# Patient Record
Sex: FEMALE | Race: WHITE | Employment: FULL TIME | URBAN - METROPOLITAN AREA
[De-identification: names, ages, dates, MRNs, and addresses within clinical notes are randomized per-mention and may not be internally consistent; named-entity substitution may affect disease eponyms.]

---

## 2019-02-01 ENCOUNTER — HOSPITAL ENCOUNTER (EMERGENCY)
Facility: HOSPITAL | Age: 39
Discharge: HOME | End: 2019-02-01
Attending: EMERGENCY MEDICINE
Payer: COMMERCIAL

## 2019-02-01 VITALS
DIASTOLIC BLOOD PRESSURE: 77 MMHG | TEMPERATURE: 98 F | OXYGEN SATURATION: 98 % | SYSTOLIC BLOOD PRESSURE: 124 MMHG | RESPIRATION RATE: 19 BRPM | HEART RATE: 81 BPM

## 2019-02-01 DIAGNOSIS — R11.2 NON-INTRACTABLE VOMITING WITH NAUSEA, UNSPECIFIED VOMITING TYPE: Primary | ICD-10-CM

## 2019-02-01 LAB
ANION GAP SERPL CALC-SCNC: 18 MMOL/L (ref 0–18)
BILIRUB UR QL: NEGATIVE
BUN SERPL-MCNC: 25 MG/DL (ref 8–20)
BUN/CREAT SERPL: 30.1 (ref 10–20)
CALCIUM SERPL-MCNC: 9.3 MG/DL (ref 8.5–10.5)
CHLORIDE SERPL-SCNC: 103 MMOL/L (ref 95–110)
CLARITY UR: CLEAR
CO2 SERPL-SCNC: 21 MMOL/L (ref 22–32)
COLOR UR: YELLOW
CREAT SERPL-MCNC: 0.83 MG/DL (ref 0.5–1.5)
GLUCOSE SERPL-MCNC: 118 MG/DL (ref 70–99)
GLUCOSE UR-MCNC: NEGATIVE MG/DL
HGB UR QL STRIP.AUTO: NEGATIVE
KETONES UR-MCNC: NEGATIVE MG/DL
LEUKOCYTE ESTERASE UR QL STRIP.AUTO: NEGATIVE
MAGNESIUM SERPL-MCNC: 2.2 MG/DL (ref 1.8–2.5)
NITRITE UR QL STRIP.AUTO: NEGATIVE
OSMOLALITY UR CALC.SUM OF ELEC: 299 MOSM/KG (ref 275–295)
PH UR: 5 [PH] (ref 5–8)
POTASSIUM SERPL-SCNC: 3.8 MMOL/L (ref 3.3–5.1)
PROT UR-MCNC: NEGATIVE MG/DL
SODIUM SERPL-SCNC: 142 MMOL/L (ref 136–144)
SP GR UR STRIP: 1.02 (ref 1–1.03)
UROBILINOGEN UR STRIP-ACNC: <2
VIT C UR-MCNC: NEGATIVE MG/DL

## 2019-02-01 PROCEDURE — 81003 URINALYSIS AUTO W/O SCOPE: CPT | Performed by: EMERGENCY MEDICINE

## 2019-02-01 PROCEDURE — 83735 ASSAY OF MAGNESIUM: CPT | Performed by: EMERGENCY MEDICINE

## 2019-02-01 PROCEDURE — 99284 EMERGENCY DEPT VISIT MOD MDM: CPT

## 2019-02-01 PROCEDURE — 96374 THER/PROPH/DIAG INJ IV PUSH: CPT

## 2019-02-01 PROCEDURE — 80048 BASIC METABOLIC PNL TOTAL CA: CPT | Performed by: EMERGENCY MEDICINE

## 2019-02-01 PROCEDURE — 96361 HYDRATE IV INFUSION ADD-ON: CPT

## 2019-02-01 RX ORDER — ONDANSETRON 4 MG/1
4 TABLET, ORALLY DISINTEGRATING ORAL EVERY 8 HOURS PRN
Qty: 15 TABLET | Refills: 0 | Status: SHIPPED | OUTPATIENT
Start: 2019-02-01 | End: 2019-02-01

## 2019-02-01 RX ORDER — ONDANSETRON 2 MG/ML
4 INJECTION INTRAMUSCULAR; INTRAVENOUS ONCE
Status: COMPLETED | OUTPATIENT
Start: 2019-02-01 | End: 2019-02-01

## 2019-02-01 RX ORDER — TRAMADOL HYDROCHLORIDE 50 MG/1
50 TABLET ORAL EVERY 12 HOURS PRN
Qty: 6 TABLET | Refills: 0 | Status: SHIPPED | OUTPATIENT
Start: 2019-02-01 | End: 2019-02-04

## 2019-02-01 RX ORDER — ONDANSETRON 4 MG/1
4 TABLET, ORALLY DISINTEGRATING ORAL EVERY 8 HOURS PRN
Qty: 15 TABLET | Refills: 0 | Status: SHIPPED | OUTPATIENT
Start: 2019-02-01 | End: 2019-02-06

## 2019-02-01 RX ORDER — METOCLOPRAMIDE 10 MG/1
10 TABLET ORAL EVERY 6 HOURS PRN
Qty: 20 TABLET | Refills: 0 | Status: SHIPPED | OUTPATIENT
Start: 2019-02-01 | End: 2019-02-06

## 2019-02-02 NOTE — ED INITIAL ASSESSMENT (HPI)
Patient states she has thrown up so many times she cannot even count since this morning. Also had one episode of diarrhea. Patient had a microdiscectomy Monday and has some pain from that.

## 2019-02-02 NOTE — ED PROVIDER NOTES
Patient Seen in: [facility] AND [facility] Emergency Department    History   Patient presents with:  Nausea/Vomiting/Diarrhea (gastrointestinal)    Stated Complaint: n/v hx of back surgery monday      HPI    40 yo F now POD 4 from low back surgery and started on h clean/dry/intact. Neurological: Alert. Skin: Skin is warm. Psychiatric: Cooperative. Nursing note and vitals reviewed.         ED Course     Labs Reviewed   BASIC METABOLIC PANEL (8) - Abnormal; Notable for the following components:       Result Value for Pain (Use caution while driving or operating machinery, this medication may make you drowsy. )., Normal, Disp-6 tablet, R-0    Metoclopramide HCl 10 MG Oral Tab  Take 1 tablet (10 mg total) by mouth every 6 (six) hours as needed (For nausea/vomiting. ). ,

## 2023-12-15 ENCOUNTER — TELEPHONE (OUTPATIENT)
Dept: UROLOGY | Facility: CLINIC | Age: 43
End: 2023-12-15

## 2023-12-15 NOTE — TELEPHONE ENCOUNTER
Spoke with patient to remind of 12/18 appointment. Provided New Patient instructions, patient expressed understanding and confirmed appointment.
travis

## 2023-12-18 ENCOUNTER — OFFICE VISIT (OUTPATIENT)
Dept: UROLOGY | Facility: CLINIC | Age: 43
End: 2023-12-18
Attending: OBSTETRICS & GYNECOLOGY
Payer: COMMERCIAL

## 2023-12-18 VITALS
DIASTOLIC BLOOD PRESSURE: 68 MMHG | TEMPERATURE: 98 F | RESPIRATION RATE: 16 BRPM | WEIGHT: 224 LBS | HEIGHT: 63 IN | BODY MASS INDEX: 39.69 KG/M2 | SYSTOLIC BLOOD PRESSURE: 118 MMHG

## 2023-12-18 DIAGNOSIS — N81.6 RECTOCELE: Primary | ICD-10-CM

## 2023-12-18 DIAGNOSIS — N81.89 PELVIC FLOOR WEAKNESS: ICD-10-CM

## 2023-12-18 DIAGNOSIS — R33.9 INCOMPLETE BLADDER EMPTYING: ICD-10-CM

## 2023-12-18 DIAGNOSIS — K59.00 CONSTIPATION: ICD-10-CM

## 2023-12-18 DIAGNOSIS — N39.3 FEMALE STRESS INCONTINENCE: ICD-10-CM

## 2023-12-18 PROCEDURE — 99212 OFFICE O/P EST SF 10 MIN: CPT

## 2023-12-18 RX ORDER — SENNOSIDES 8.8 MG/5ML
5 LIQUID ORAL 2 TIMES DAILY
COMMUNITY

## 2023-12-18 RX ORDER — LEVOTHYROXINE SODIUM 0.03 MG/1
TABLET ORAL
COMMUNITY

## 2023-12-18 RX ORDER — BACLOFEN 10 MG/1
TABLET ORAL
COMMUNITY
Start: 2023-03-14

## 2023-12-18 RX ORDER — WHEAT DEXTRIN 1 G
TABLET,CHEWABLE ORAL
COMMUNITY

## 2024-01-16 ENCOUNTER — TELEPHONE (OUTPATIENT)
Dept: UROLOGY | Facility: CLINIC | Age: 44
End: 2024-01-16

## 2024-01-16 NOTE — TELEPHONE ENCOUNTER
TC from patient stating that she would like to cancel her UDS and UDS follow-up as she will be seeing a new doctor

## 2024-04-22 RX ORDER — AMOXICILLIN 500 MG/1
500 CAPSULE ORAL 2 TIMES DAILY
COMMUNITY

## 2024-04-25 ENCOUNTER — HOSPITAL ENCOUNTER (OUTPATIENT)
Facility: HOSPITAL | Age: 44
Setting detail: HOSPITAL OUTPATIENT SURGERY
Discharge: HOME | End: 2024-04-25
Attending: INTERNAL MEDICINE | Admitting: INTERNAL MEDICINE
Payer: COMMERCIAL

## 2024-04-25 VITALS
DIASTOLIC BLOOD PRESSURE: 99 MMHG | BODY MASS INDEX: 38.27 KG/M2 | HEART RATE: 99 BPM | HEIGHT: 63 IN | OXYGEN SATURATION: 98 % | WEIGHT: 216 LBS | SYSTOLIC BLOOD PRESSURE: 126 MMHG | RESPIRATION RATE: 18 BRPM

## 2024-04-25 PROCEDURE — 99152 MOD SED SAME PHYS/QHP 5/>YRS: CPT | Performed by: INTERNAL MEDICINE

## 2024-04-25 PROCEDURE — 99153 MOD SED SAME PHYS/QHP EA: CPT | Performed by: INTERNAL MEDICINE

## 2024-04-25 PROCEDURE — 0DJD8ZZ INSPECTION OF LOWER INTESTINAL TRACT, VIA NATURAL OR ARTIFICIAL OPENING ENDOSCOPIC: ICD-10-PCS | Performed by: INTERNAL MEDICINE

## 2024-04-25 PROCEDURE — S0028 INJECTION, FAMOTIDINE, 20 MG: HCPCS | Performed by: STUDENT IN AN ORGANIZED HEALTH CARE EDUCATION/TRAINING PROGRAM

## 2024-04-25 RX ORDER — ACETAMINOPHEN 500 MG
1000 TABLET ORAL ONCE
Status: CANCELLED | OUTPATIENT
Start: 2024-04-25 | End: 2024-04-25

## 2024-04-25 RX ORDER — SODIUM CHLORIDE 0.9 % (FLUSH) 0.9 %
10 SYRINGE (ML) INJECTION AS NEEDED
Status: DISCONTINUED | OUTPATIENT
Start: 2024-04-25 | End: 2024-04-25

## 2024-04-25 RX ORDER — SODIUM CHLORIDE, SODIUM LACTATE, POTASSIUM CHLORIDE, CALCIUM CHLORIDE 600; 310; 30; 20 MG/100ML; MG/100ML; MG/100ML; MG/100ML
INJECTION, SOLUTION INTRAVENOUS CONTINUOUS
Status: DISCONTINUED | OUTPATIENT
Start: 2024-04-25 | End: 2024-04-25

## 2024-04-25 RX ORDER — OXYCODONE HYDROCHLORIDE 5 MG/1
1 TABLET ORAL EVERY 12 HOURS PRN
COMMUNITY

## 2024-04-25 RX ORDER — METOCLOPRAMIDE 10 MG/1
10 TABLET ORAL ONCE
Status: DISCONTINUED | OUTPATIENT
Start: 2024-04-25 | End: 2024-04-25

## 2024-04-25 RX ORDER — METOCLOPRAMIDE HYDROCHLORIDE 5 MG/ML
10 INJECTION INTRAMUSCULAR; INTRAVENOUS ONCE
Status: DISCONTINUED | OUTPATIENT
Start: 2024-04-25 | End: 2024-04-25

## 2024-04-25 RX ORDER — FAMOTIDINE 20 MG/1
20 TABLET, FILM COATED ORAL ONCE
Status: CANCELLED | OUTPATIENT
Start: 2024-04-25 | End: 2024-04-25

## 2024-04-25 RX ORDER — METOCLOPRAMIDE 10 MG/1
10 TABLET ORAL ONCE
Status: CANCELLED | OUTPATIENT
Start: 2024-04-25 | End: 2024-04-25

## 2024-04-25 RX ORDER — ACETAMINOPHEN/DIPHENHYDRAMINE 500MG-25MG
1 TABLET ORAL AS NEEDED
COMMUNITY

## 2024-04-25 RX ORDER — FAMOTIDINE 10 MG/ML
20 INJECTION, SOLUTION INTRAVENOUS ONCE
Status: DISCONTINUED | OUTPATIENT
Start: 2024-04-25 | End: 2024-04-25

## 2024-04-25 RX ORDER — FAMOTIDINE 20 MG/1
20 TABLET, FILM COATED ORAL ONCE
Status: DISCONTINUED | OUTPATIENT
Start: 2024-04-25 | End: 2024-04-25

## 2024-04-25 RX ORDER — SODIUM CHLORIDE, SODIUM LACTATE, POTASSIUM CHLORIDE, CALCIUM CHLORIDE 600; 310; 30; 20 MG/100ML; MG/100ML; MG/100ML; MG/100ML
INJECTION, SOLUTION INTRAVENOUS CONTINUOUS
Status: CANCELLED | OUTPATIENT
Start: 2024-04-25

## 2024-04-25 RX ORDER — MIDAZOLAM HYDROCHLORIDE 1 MG/ML
1 INJECTION INTRAMUSCULAR; INTRAVENOUS EVERY 5 MIN PRN
Status: DISCONTINUED | OUTPATIENT
Start: 2024-04-25 | End: 2024-04-25

## 2024-04-25 RX ORDER — FAMOTIDINE 10 MG/ML
20 INJECTION, SOLUTION INTRAVENOUS ONCE
Status: CANCELLED | OUTPATIENT
Start: 2024-04-25

## 2024-04-25 RX ORDER — ACETAMINOPHEN 500 MG
1000 TABLET ORAL ONCE
Status: DISCONTINUED | OUTPATIENT
Start: 2024-04-25 | End: 2024-04-25

## 2024-04-25 RX ORDER — METOCLOPRAMIDE HYDROCHLORIDE 5 MG/ML
10 INJECTION INTRAMUSCULAR; INTRAVENOUS ONCE
Status: CANCELLED | OUTPATIENT
Start: 2024-04-25

## 2024-04-25 NOTE — DISCHARGE INSTRUCTIONS
ENDOSCOPY DISCHARGE INSTRUCTIONS    Procedure Performed:   Colonoscopy    Endoscopist: No name on file  FINDINGS:   Normal colon and internal hemorrhoids which are the source of the isolated bleeding.    MEDICATIONS:  You may resume all other medications today    DIET:  Resume Normal Diet and Daily fiber supplement such as (Citrucel, Metamucil, Benefiber) 1 tabelspoon daily    BIOPSIES:  No biopsies were taken    X-RAYS/LABS:   No X-rays/Labs were ordered today    ADDITIONAL RECOMMENDATIONS:    - Repeat Colonoscopy at age 51 for screening.  - Bleeding is due to hemorrhoids (Resolved):  - Start on daily fiber supplement (Benefiber, Citrucel or Metamucil)  - Instructed to avoid straining with bowel movements  - Instructed to avoid sitting on toilet for more than 5 minutes at a time  - Start on Miralax as needed to soften stool  - Anusol if bleeding returns    Activity for remainder of today:    REST TODAY  DO NOT drive or operate heavy machinery  DO NOT drink any alcoholic beverages  DO NOT sign any legal documents or make any important decisions    After your procedure(s):  It is not unusual to feel bloated or gassy .  Passing gas and belching is encouraged. Lying on your left side with your knees flexed may relieve the discomfort. A hot pack to the abdomen may also help.    FOLLOW-UP:  Contact the office at [phone] for follow-up appointment is needed or if you develop any of the following:    Severe abdominal pain/discomfort     Excessive bleeding                     Black tarry stool    Difficulty breathing/swallowing      Persistent nausea/vomiting  Fever above 100 degrees or chills    Home Care Instructions for Colonoscopy with Sedation    Diet:  - Resume your regular diet as tolerated unless otherwise instructed.  - Start with light meals to minimize bloating.  - Do not drink alcohol today.    Medication:  - If you have questions about resuming your normal medications, please contact your Primary Care  Physician.    Activities:  - Take it easy today. Do not return to work today.  - Do not drive today.  - Do not operate any machinery today (including kitchen equipment).    Colonoscopy:  - You may notice some rectal \"spotting\" (a little blood on the toilet tissue) for a day or two after the exam. This is normal.  - If you experience any rectal bleeding (not spotting), persistent tenderness or sharp severe abdominal pains, oral temperature over 100 degrees Fahrenheit, light-headedness or dizziness, or any other problems, contact your doctor.    **If unable to reach your doctor, please go to the [facility] Emergency Room**    - Your referring physician will receive a full report of your examination.  - If you do not hear from your doctor's office within two weeks of your biopsy, please call them for your results.    You may be able to see your laboratory results in [employer] between 4 and 7 business days.  In some cases, your physician may not have viewed the results before they are released to [employer].  If you have questions regarding your results contact the physician who ordered the test/exam by phone or via [employer] by choosing \"Ask a Medical Question.\"

## 2024-04-25 NOTE — H&P
HISTORY AND PHYSICAL FOR ENDOSCOPIC PROCEDURE      Wanda Johnson Patient Status:  Hospital Outpatient Surgery    1980 MRN [id number]   Location [facility] ENDOSCOPY LAB SUITES Attending Alon Philip MD   Hosp Day # 0 PCP ANTONIO WHITE     Date of Consult:  25    Reason for Consultation:  Rectal Bleeding    History of Present Illness:  Wanda Johnson is a a(n) 43 year old female who presents for Colonoscopy.      History:  Past Medical History:    Back problem    lower back fusion and laminectomy    Depression    Hypothyroid    Muscle weakness    left leg numbness and weakness- spinal caudal equina    Neuropathy    left leg     Past Surgical History:   Procedure Laterality Date    Appendectomy      Hysterectomy  2023          Other surgical history      microdisectomy    Rotator cuff repair      Spine surgery procedure unlisted       History reviewed. No pertinent family history.   reports that she has never smoked. She has never used smokeless tobacco. She reports that she does not drink alcohol and does not use drugs.    Allergies:  Allergies   Allergen Reactions    Bee Venom ANAPHYLAXIS    Chlorhexidine RASH    Honey Bee Venom ANAPHYLAXIS    Adhesive Tape OTHER (SEE COMMENTS)     Tegaderm swelling and itching     Tramadol UNKNOWN and OTHER (SEE COMMENTS)     Nausea flu like symptoms     Vicodin [Hydrocodone-Acetaminophen] UNKNOWN     Nausea and flu like symptoms    Tolerates: Oxycodone and tylenol        Medications:    Current Facility-Administered Medications:     sodium chloride 0.9% 0.9% flush injection 10 mL, 10 mL, Intravenous, PRN    lactated ringers infusion, , Intravenous, Continuous    midazolam (Versed) 2 MG/2ML injection 1 mg, 1 mg, Intravenous, Q5 Min PRN    fentaNYL (Sublimaze) 50 mcg/mL injection 25 mcg, 25 mcg, Intravenous, Q5 Min PRN    lactated ringers infusion, , Intravenous, Continuous    famotidine (Pepcid) tab 20 mg, 20 mg, Oral, Once **OR**  famotidine (Pepcid) 20 mg/2mL injection 20 mg, 20 mg, Intravenous, Once    metoclopramide (Reglan) tab 10 mg, 10 mg, Oral, Once **OR** metoclopramide (Reglan) 5 mg/mL injection 10 mg, 10 mg, Intravenous, Once    Review of Systems:  Gastrointestinal: negative other than specified in the HPI  General: negative other than specified in the HPI  Neurological: negative other than specified in the HPI  Cardiovascular: negative other than specified in the HPI  Respiratory: negative other than specified in the HPI  Skin: negative other than specified in the HPI  Allergy: negative other than specified in the HPI  ENT: negative other than specified in the HPI  Physical Exam:    Height 160 cm (5' 3\"), weight 216 lb (98 kg).    General: Appears alert, oriented x3 and in no acute distress.  HEENT: Normal. No neck vein distention. Thyroid not enlarged.  No lymphadenopathy.  CV: S1 and S2 normal.  No murmurs or gallops.  Lungs: Clear to auscultation.  Abdomen: Soft and nondistended.  Nontender.  No masses.  Bowel sounds are present.  Back: No CVA tenderness.    Imaging:      Assessment/Plan:  43 year old female who presents for Colonoscopy.    Plan for Colonoscopy today.    Informed consent was obtained for colonoscopy with possible biopsy, dilation, polypectomy, therapy, banding and control of bleeding after explanation of risks, benefits and alternatives to the procedure. Risks include but not limited to bleeding, infection, perforation, missed polyps or cancer and risks of sedation.       Alon Philip MD  4/25/2024  3:19 PM

## 2024-04-25 NOTE — DISCHARGE INSTRUCTIONS
POST OPERATIVE INFORMATION & INSTRUCTIONS FOLLOWING ROBOTIC OR OPEN ABDOMINAL SURGERY  WHAT TO EXPECT AT HOME:     Recovery from surgery is generally 2-6 weeks, but sometimes longer for more strenuous activity.  It is normal to be very tired during this time.     Recuperation varies with each individual.  Some people recover more quickly than others.  Do not be discouraged if you need a little longer to recover.  It is common to have pain along the incisions, temporary bloating/gas pain, or shoulder pain after robotic surgery. This should improve with time and activity.   It often takes several weeks before bladder function returns to normal. It is common for many patients to experience some mild leakage, need to urinate often and immediately. If these problems are persistent and bothersome, please call your doctor's office or discuss at your post-operative visit  If you also had a vaginal surgery, it is normal to have some drainage/discharge or a small amount of vaginal bleeding after surgery which may last up to 6 weeks. You may also pass small pieces of suture for 4-6 weeks after surgery.  This is normal, and stitches are absorbable.     INCISIONS  Showering and bathing:   It is okay to shower 24 hours after your surgery.   Avoid baths/swimming/hot tubs/pools for 4 weeks or until your incisions completely heal. Keeping incisions underwater can affect the healing process.   Your incisions are closed with absorbable sutures and skin glue or surgical tape on top   You may let soapy water run over the incision. There is no need to scrub the incision. Allow the skin glue or surgical tape to fall off on its own.     ACTIVITY   Lifting: No more than 10 pounds for 6 weeks. For reference, a full gallon of milk is 10 pounds. Thus, no lifting laundry, groceries, children, or pets or pushing heavy vacuums, or grocery carts.  No high impact exercises (aerobic activity, using exercise machines, weight-lifting etc.) for 6  weeks.  We encourage you to start walking regularly starting the day after surgery.   You may climb stairs as tolerated  You may return to work 1-4 weeks after surgery, depending on your job and your surgery.  Ask your doctor about your specific situation. Please contact your doctor's office if you need any [id number] letters or medical leave paperwork completed.   Do not put anything in your vagina for 6 weeks after surgery unless otherwise instructed by your doctor (including tampons, douching, sexual intercourse, etc.).     When you begin to have sexual intercourse again, use water soluble lubricants (e.g., K-Y Jelly) for a short period of time. Most of the discomfort that is experienced early improves with time; however, report any long-term discomfort to your doctor.   Do not drive until you feel that you are ready and can safely slam the brakes if needed. Do not drive while you are taking narcotic pain medication  Avoid sitting or lying in bed for more than 2 hours at a time while you are awake to reduce your risk of blood clots.     PAIN      Vaginal soreness and pelvic discomfort are normal for approximately 6 weeks after surgery.    The first 3 days after surgery we recommend that you rotate between Tylenol and ibuprofen so that you are taking one of these medications every 3 to 4 hours while awake. In this way, you can help prevent pain.   Tylenol* and Ibuprofen** should be the first medications you use for pain. Heat or ice may also help. Please take Ibuprofen with food. Some pain medications can cause constipation (see the section on constipation).   After 3 days, you can take Tylenol and Ibuprofen only as needed.   You may have been prescribed a narcotic~ pain medication (Oxycodone, Percocet, Norco, Tylenol #3, Valium, Tramadol, Hydrocodone).  Use narcotic pain medications for severe pain not improved by the above the first few days after surgery. Please transition to Tylenol or ibuprofen only  within the first 2-3 days after surgery if possible.      Pain management plan example:   Step 1: Over the counter medications  Extra strength Tylenol (500 mg) - take 2 tabs (1000 mg) every 6 hours  Ibuprofen (200 mg) - take 3 tabs (600 mg) every 6 hours    For example:   6 AM - take 1000 mg Tylenol  9AM - take 600 mg Ibuprofen  12 PM - again take 1000 mg Tylenol  3 PM - again take 600 mg Ibuprofen  So on and so on…  Step 2: Prescription pain medication  Oxycodone 5 mg: take 1 tab every 8 hours for additional pain if prescribed    PAIN MEDICATION INFORMATION:    For the first 2 days you should take Toradol and Tylenol alternating every 6 hours scheduled. Do not wait for the pain. For example take Toradol at 3pm, Tylenol at 6pm, Toradol at 9pm and so on. This way you can get ahead of any pain before it starts.    *The maximum amount of Tylenol you can take in a 24-hour period is 4000 mg. Taking over this amount could cause liver damage. Make sure that if you are taking additional narcotic pain medication it does not contain acetaminophen, the active ingredient in Tylenol. lf it does, you must account for in your daily total. For example: Norco or Percocet typically contain 325mg of Tylenol. Wean this medication as tolerated. Tylenol is processed by your liver. Do not take Tylenol if you are have a history of liver failure. Do not mix with alcohol.   **You may take 200-800mg of ibuprofen (Advil) every 8 hours as needed for pain after you are done taking the Toradol. Do not take Toradol and Ibuprofen together as they are the same type of medication. This will help with pain from inflammation (swelling). Ibuprofen and Toradol is processed by your kidneys. If you have any history of kidney disease you should avoid ibuprofen, Toradol and all types of NSAIDS (Aleve, Motrin, Advil). Please take ibuprofen with food. Avoid if you have a history of stomach ulcers. If you are taking the maximum dose of ibuprofen (800mg every 8  hours), reduce this amount to 200-400mg ibuprofen every 8 hours as your pain improves.   ~Remember that narcotics are addictive, sedating, and constipating.  You should be taking a stool softener once or twice a day while on this medication. If you are prescribed narcotic pain medication, please use the medication as instructed. Do not use more than instructed. Do not take this medication with alcohol, sedatives, anti-anxiety medications, or sleep aids.      ~ If prescribed, it is important to keep narcotic pills safely stored, as it is at great risk of being stolen or misused by family, friends, or even strangers. Please be sure to dispose of leftover pain medication after you have recovered. You may dispose of unused narcotic medications in the trash with an unpleasant substance such as coffee grounds or cat litter. You can also check FDA.gov to assess which medications can be safely flushed down the toilet.     CONSTIPATION  Miralax daily as a stool softener until you are off of narcotics and your bowel habits are back to normal.  If you have diarrhea, stop the stool softener.    If you have not had a bowel movement 2 days after surgery, you should take Milk of Magnesia, Dulcolax, Metamucil, magnesium citrate or other over-the-counter (OTC) laxatives for relief. Take laxatives with plenty of water.  Do not take Milk of Magnesia or magnesium citrate if you have chronic kidney disease.  If you had a rectocele repair, rectal pressure (feeling like you need to have a bowel movement) is also very common.  However, you should not strain to have a bowel movement or sit on the toilet for extended periods of time.  The feeling like you need to have a bowel movement may just be the swelling from surgery.   Do not use rectal suppositories if you had a rectocele repair for 4 weeks after surgery     HOME MEDICATIONS:  It is uncommon that you would receive an antibiotic prescription to use at home. You received antibiotics  during surgery while in the hospital.  Please resume all of your home medications that you were on before surgery immediately.  If you are on a blood thinning medication, please resume 1 day after surgery unless otherwise instructed.  If you were prescribed vaginal estrogen, you should resume it 6 weeks after surgery unless otherwise instructed.     URINATION AFTER SURGERY  Immediately after surgery, you may have a catheter in place. If so, you may experience urinary urgency and some bladder pressure/pain.  Although these symptoms are often associated with a urinary tract infection, they can also be caused by bladder spasms.  Call our office if you are having fevers in addition to urinary urgency, burning with urination, and bladder pain. A fever (Temp > 101.5 ? F) is more suggestive of an infection.  Before you leave the hospital, you may be asked to perform a voiding trial.  This tests your ability to urinate and empty your bladder after surgery.  If you are able to urinate, you will be discharged home without a catheter.    Even if you pass the voiding trial, there is a small chance that you can go into urinary retention (have difficulty urinating).  If you do not urinate within 4-6 hours of catheter removal and you feel like your bladder is full but you can't urinate, go to your local urologist, local emergency room, or our emergency room for catheter placement.  If this occurs, please call our office so we can schedule an appointment for another voiding trial after the swelling has had time to subside.  If you do not pass the voiding trial prior to discharge, then the nursing staff will replace a Doe catheter in your bladder until the swelling resolves.  You will need to follow-up in our clinic or your local urologist's office in 3-4 days to repeat the voiding trial. In some cases you may also be taught how to perform self-catheterization. If this is necessary, further instructions will be provided.    DIET  You can resume a regular diet once you are discharged.  We recommend a light diet at first if you have nausea or vomiting from pain medications or anesthesia.  We also recommend a high-fiber diet to help with bowel movements.    FOLLOW UP  Typical follow-up is between 3-4 weeks after surgery with your doctor's assistant or your doctor  Your doctor's office will contact you regarding the timing of your follow up appointment  Call the number below for your doctor during normal business hours for your follow up appointment(s)      WHEN SHOULD I CALL THE DOCTOR?  If you have a sudden onset of severe abdominal pain with nausea/vomiting please contact your doctor's office immediately.    Call your doctor if you experience any of the following symptoms:   Bright red vaginal bleeding that soaks a heavy pad  Temperature greater than 101.5 ?F (38.5?C)   Persistent vomiting   Worsening pain that is not relieved by over the counter and prescription pain medication   Large amounts of vaginal discharge that is foul smelling, yellow or green that does not improve.    If questions or concerns:   Call [phone] to reach your physician's office or send a isocket message and either myself or one of my staff members will get back to you as soon as possible.     OR: Go to the nearest Emergency Room if you feel any signs of symptoms that warrant physician's immediate attention.     Manisha Berger DO, [facility]  [phone]   HOME INSTRUCTIONS  AMBSURG HOME CARE INSTRUCTIONS: POST-OP ANESTHESIA  The medication that you received for sedation or general anesthesia can last up to 24 hours. Your judgment and reflexes may be altered, even if you feel like your normal self.      We Recommend:   Do not drive any motor vehicle or bicycle   Avoid mowing the lawn, playing sports, or working with power tools/applicances (power saws, electric knives or mixers)   That you have someone stay with you on your first night home   Do not  drink alcohol or take sleeping pills or tranquilizers   Do not sign legal documents within 24 hours of your procedure   If you had a nerve block for your surgery, take extra care not to put any pressure on your arm or hand for 24 hours    It is normal:  For you to have a sore throat if you had a breathing tube during surgery (while you were asleep!). The sore throat should get better within 48 hours. You can gargle with warm salt water (1/2 tsp in 4 oz warm water) or use a throat lozenge for comfort  To feel muscle aches or soreness especially in the abdomen, chest or neck. The achy feeling should go away in the next 24 hours  To feel weak, sleepy or \"wiped out\". Your should start feeling better in the next 24 hours.   To experience mild discomforts such as sore lip or tongue, headache, cramps, gas pains or a bloated feeling in your abdomen.   To experience mild back pain or soreness for a day or two if you had spinal or epidural anesthesia.   If you had laparoscopic surgery, to feel shoulder pain or discomfort on the day of surgery.   For some patients to have nausea after surgery/anesthesia    If you feel nausea or experience vomiting:   Try to move around less.   Eat less than usual or drink only liquids until the next morning   Nausea should resolve in about 24 hours    If you have a problem when you are at home:    Call your surgeons office   Discharge Instructions: After Your Surgery  You’ve just had surgery. During surgery, you were given medicine called anesthesia to keep you relaxed and free of pain. After surgery, you may have some pain or nausea. This is common. Here are some tips for feeling better and getting well after surgery.   Going home  Your healthcare provider will show you how to take care of yourself when you go home. They'll also answer your questions. Have an adult family member or friend drive you home. For the first 24 hours after your surgery:   Don't drive or use heavy equipment.  Don't  make important decisions or sign legal papers.  Take medicines as directed.  Don't drink alcohol.  Have someone stay with you, if needed. They can watch for problems and help keep you safe.  Be sure to go to all follow-up visits with your healthcare provider. And rest after your surgery for as long as your provider tells you to.   Coping with pain  If you have pain after surgery, pain medicine will help you feel better. Take it as directed, before pain becomes severe. Also, ask your healthcare provider or pharmacist about other ways to control pain. This might be with heat, ice, or relaxation. And follow any other instructions your surgeon or nurse gives you.      Stay on schedule with your medicine.     Tips for taking pain medicine  To get the best relief possible, remember these points:   Pain medicines can upset your stomach. Taking them with a little food may help.  Most pain relievers taken by mouth need at least 20 to 30 minutes to start to work.  Don't wait till your pain becomes severe before you take your medicine. Try to time your medicine so that you can take it before starting an activity. This might be before you get dressed, go for a walk, or sit down for dinner.  Constipation is a common side effect of some pain medicines. Call your healthcare provider before taking any medicines such as laxatives or stool softeners to help ease constipation. Also ask if you should skip any foods. Drinking lots of fluids and eating foods such as fruits and vegetables that are high in fiber can also help. Remember, don't take laxatives unless your surgeon has prescribed them.  Drinking alcohol and taking pain medicine can cause dizziness and slow your breathing. It can even be deadly. Don't drink alcohol while taking pain medicine.  Pain medicine can make you react more slowly to things. Don't drive or run machinery while taking pain medicine.  Your healthcare provider may tell you to take acetaminophen to help ease  your pain. Ask them how much you're supposed to take each day. Acetaminophen or other pain relievers may interact with your prescription medicines or other over-the-counter (OTC) medicines. Some prescription medicines have acetaminophen and other ingredients in them. Using both prescription and OTC acetaminophen for pain can cause you to accidentally overdose. Read the labels on your OTC medicines with care. This will help you to clearly know the list of ingredients, how much to take, and any warnings. It may also help you not take too much acetaminophen. If you have questions or don't understand the information, ask your pharmacist or healthcare provider to explain it to you before you take the OTC medicine.   Managing nausea  Some people have an upset stomach (nausea) after surgery. This is often because of anesthesia, pain, or pain medicine, less movement of food in the stomach, or the stress of surgery. These tips will help you handle nausea and eat healthy foods as you get better. If you were on a special food plan before surgery, ask your healthcare provider if you should follow it while you get better. Check with your provider on how your eating should progress. It may depend on the surgery you had. These general tips may help:   Don't push yourself to eat. Your body will tell you when to eat and how much.  Start off with clear liquids and soup. They're easier to digest.  Next try semi-solid foods as you feel ready. These include mashed potatoes, applesauce, and gelatin.  Slowly move to solid foods. Don’t eat fatty, rich, or spicy foods at first.  Don't force yourself to have 3 large meals a day. Instead eat smaller amounts more often.  Take pain medicines with a small amount of solid food, such as crackers or toast. This helps prevent nausea.  When to call your healthcare provider  Call your healthcare provider right away if you have any of these:   You still have too much pain, or the pain gets worse, after  taking the medicine. The medicine may not be strong enough. Or there may be a complication from the surgery.  You feel too sleepy, dizzy, or groggy. The medicine may be too strong.  Side effects such as nausea or vomiting. Your healthcare provider may advise taking other medicines to .  Skin changes such as rash, itching, or hives. This may mean you have an allergic reaction. Your provider may advise taking other medicines.  The incision looks different (for instance, part of it opens up).  Bleeding or fluid leaking from the incision site, and weren't told to expect that.  Fever of 100.4°F (38°C) or higher, or as directed by your provider.  Call 911  Call 911 right away if you have:   Trouble breathing  Facial swelling    If you have obstructive sleep apnea   You were given anesthesia medicine during surgery to keep you comfortable and free of pain. After surgery, you may have more apnea spells because of this medicine and other medicines you were given. The spells may last longer than normal.    At home:  Keep using the continuous positive airway pressure (CPAP) device when you sleep. Unless your healthcare provider tells you not to, use it when you sleep, day or night. CPAP is a common device used to treat obstructive sleep apnea.  Talk with your provider before taking any pain medicine, muscle relaxants, or sedatives. Your provider will tell you about the possible dangers of taking these medicines.  Contact your provider if your sleeping changes a lot even when taking medicines as directed.  [employer] last reviewed this educational content on 10/1/2021  © 8502-1943 [employer]. All rights reserved. This information is not intended as a substitute for professional medical care. Always follow your healthcare professional's instructions.

## 2024-04-25 NOTE — OPERATIVE REPORT
Colonoscopy Operative Report    Wanda Johnson Patient Status:  [facility] Outpatient Surgery    1980 MRN [id number]   Location [facility] ENDOSCOPY LAB SUITES Attending Alon Philip MD   Hosp Day #   0 PCP ANTONIO WHITE     Pre-Operative Diagnosis: Pelvic floor dysfunction in female/Rectum bleeding    Post-Operative Diagnosis:  Grade 2 Internal Hemorrhoids    Procedure Performed: COLONOSCOPY    Informed Consent: Informed consent for both the procedure and sedation were obtained from the patient. The potentially life-threatening complications of sedation, bleeding,  perforation, transfusion or repeat endoscopy  were reviewed along with the possible need for hospitalization, surgical management, transfusion or repeat endoscopy should one of these complications arise. The patient understands and is agreeable to proceed.  Sedation Type: Conscious Sedation- 7 mg of Versed, 100 mcg of Fentanyl given in divided doses as per protocol  Moderate Sedation Time: 13 Minutes   A trained sedation nurse was present to assist in monitoring the patient during the entire length of moderate sedation time.    Cecum Withdrawal Time:  6 Minutes  Date of previous colonoscopy: None    Procedure Description: The patient was placed in the left lateral decubitus position.  After careful digital rectal examination, the Adult colonoscope was inserted into the rectum and advanced to the level of the cecum under direct visualization. The cecum was identified by landmarks, including the appendiceal orifice and ileoceccal valve. Careful examination of the entire colon was performed during withdrawal of the endoscope. The scope was withdrawn to the rectum and retroflexion was performed.  The patient tolerated the procedure well with no immediate complications. The patient was transferred to the recovery area in stable condition.  Quality of Preparation: Adequate  Aronchick Bowel  Prep Scale:  1  Estimated Blood Loss:  < 1 ml    Findings:   Terminal Ileum:  Normal appearing ileal mucosa.  Colon:  Otherwise normal colon without evidence of polyps, lesions, masses, ulcers or changes in colonic mucosa.    Rectum:  Grade 2 internal hemorrhoids seen on retroflexion.  Normal manual rectal exam.      Recommendations:   - Repeat Colonoscopy at age 51 for screening.  - Bleeding is due to hemorrhoids (Resolved):  - Start on daily fiber supplement (Benefiber, Citrucel or Metamucil)  - Instructed to avoid straining with bowel movements  - Instructed to avoid sitting on toilet for more than 5 minutes at a time  - Start on Miralax as needed to soften stool  - Anusol if bleeding returns    Discharge:  The patient was given an after visit summary detailing the procedure, findings, recommendations and follow up plans.     Alon Philip MD  4/25/2024  3:50 PM

## 2024-04-28 ENCOUNTER — ANESTHESIA EVENT (OUTPATIENT)
Dept: SURGERY | Facility: HOSPITAL | Age: 44
End: 2024-04-28
Payer: COMMERCIAL

## 2024-04-28 NOTE — H&P
44yo F with incomplete emptying since emergent lumbar lamiectomy on 3/16/23. No urinary incontinence. Has been doing pelvic floor PT and cathing 3x/day for ~300ml. Also has bad prolapse. Exam- Ba 0, C 0, Bp +2. Failed #9 ring as feel our right. #9 cube was VERY painful. Sometimes urinates between caths and sometimes does. Still does have any sensation down there     Also has FI daily/multiple time/day. Has constipation/diarrhea. Is just miserable.      H/o hysterectomy in the past. Is between RASC, posterior repair w me and vaginal surgery w Melina who she is seeing a second opinion. Has decided to undergo RASC, posterior repair with me. Just underwetrnL4-L5 fusion with laminectomy of L5-S1. She is 8 weeks post-op and doing great. Ok from back surgeon to proceed.                 History/Other:          Current Outpatient Medications   Medication Sig Dispense Refill    levothyroxine 25 MCG Oral Tab levothyroxine 25 mcg tablet   TAKE 1 TABLET BY MOUTH EVERY DAY. REPEAT LABS        diphenhydrAMINE-APAP, sleep, (TYLENOL PM EXTRA STRENGTH)  MG/30ML Oral Liquid Tylenol PM Extra Strength   as needed        baclofen 10 MG Oral Tab TAKE 1 TABLET BY MOUTH EVERY NIGHT AT BEDTIME AS NEEDED FOR SPASM          History reviewed. No pertinent past medical history.           Past Surgical History:   Procedure Laterality Date    HYSTERECTOMY         01/11/2023    OTHER SURGICAL HISTORY         micro disectomy 03/16/2023          History reviewed. No pertinent family history.     REVIEW OF SYSTEMS:     A 10-point comprehensive review of systems was negative with pertinent items noted in HPI.            Objective:   GENERAL: well developed, well nourished, in no apparent distress  HEENT: atraumatic, normocephalic  LUNGS: normal respiratory motion without distress  CARDIO:NA  Abd: Soft, non-tender, non-distended  : No SPT or CVAT                 Lab Results   Component Value Date     GLUCOSEDIP Negative 10/03/2023     BILIRUBIN  Negative 10/03/2023     KETONESDIP Negative 10/03/2023     SPECGRAVITY 1.010 10/03/2023     BLOODU Negative 10/03/2023     PHURINE 6.0 10/03/2023     PROTEINDIP Negative 10/03/2023     UROBILIN 0.2 10/03/2023     NITRITE Negative 10/03/2023     LEUKOCYTES Negative 10/03/2023         600ml via straight cath           Assessment & Plan:   44yo F  Diagnoses and all orders for this visit:     Incomplete emptying of bladder  -     MEASUREMENT, POST-VOIDING RESIDUAL URINE &/OR BLADDER CAPACITY, US, NON-IMAGING  -     URINE CULTURE, ROUTINE; Future     Midline cystocele     Rectocele     Full incontinence of feces      Plan:  -Assessment/Plan:  1. Pelvic organ prolapse  Discussed the natural history of pelvic organ prolapse. Prolapse is unlikely to self-resolve, although may not necessarily worsen with time with care in avoidance of abdominal straining, heavy lifting, and weight gain.  All the treatment options were reviewed including      1) Watchful waiting particularly if symptoms are non-bothersome. Should have a good bowel regimen, avoiding constipation and straining. Avoiding heavy weightlifting if possible. Exhaling with lifting to decrease transmission of force to pelvis.       2) Kegel exercise/strenthing - would not anatomically correct the prolapse but may decrease the sensation of a vaginal bulge.       3) pessary placement- would require removal and cleaning regularly (not necessarily daily) that could be performed by the patient with proper instruction or managed by our physician assistant.       4) Surgical repair - which would be an outpatient procedure and require pelvic rest + lifting restrictions for at least 6 weeks.      -Surgical options including vaginal, robotic and open abdominal operations were discussed at length as well as the risks and benefits incurred by treatment option.     -A concurrent mid-urethral sling procedure was also discussed for the treatment of CASH/prevention of de reed CASH after  prolapse repair. All relevant recent data discussed with patient as well as risks associate with mesh.     Patient is thinking about if she wants to undergo RASC posterior repair, perineorrhaphy w me or have surgery with Dr. Summers. Dr. Summers wants her to get UDS. UDS will not give me any more information or change her treatment plan.        Assured patient that whatever she decides I am here to support her. Understands risks of recurrence much lower with RASC compared to vaginal approach. Is set to undergo RASC, posterior. Exam: : Ba 0, C 0, Bp +2      Manisha Berger DO

## 2024-04-29 ENCOUNTER — HOSPITAL ENCOUNTER (OUTPATIENT)
Facility: HOSPITAL | Age: 44
Setting detail: HOSPITAL OUTPATIENT SURGERY
Discharge: HOME | End: 2024-04-29
Attending: STUDENT IN AN ORGANIZED HEALTH CARE EDUCATION/TRAINING PROGRAM | Admitting: STUDENT IN AN ORGANIZED HEALTH CARE EDUCATION/TRAINING PROGRAM
Payer: COMMERCIAL

## 2024-04-29 ENCOUNTER — ANESTHESIA (OUTPATIENT)
Dept: SURGERY | Facility: HOSPITAL | Age: 44
End: 2024-04-29
Payer: COMMERCIAL

## 2024-04-29 VITALS
WEIGHT: 211.69 LBS | SYSTOLIC BLOOD PRESSURE: 122 MMHG | OXYGEN SATURATION: 100 % | TEMPERATURE: 98 F | HEART RATE: 62 BPM | HEIGHT: 63 IN | RESPIRATION RATE: 16 BRPM | DIASTOLIC BLOOD PRESSURE: 64 MMHG | BODY MASS INDEX: 37.51 KG/M2

## 2024-04-29 DIAGNOSIS — N81.6 RECTOCELE: Primary | ICD-10-CM

## 2024-04-29 LAB
ANION GAP SERPL CALC-SCNC: 9 MMOL/L (ref 0–18)
ANTIBODY SCREEN: NEGATIVE
BASOPHILS # BLD AUTO: 0.02 X10(3) UL (ref 0–0.2)
BASOPHILS NFR BLD AUTO: 0.5 %
BUN BLD-MCNC: 13 MG/DL (ref 9–23)
BUN/CREAT SERPL: 22.4 (ref 10–20)
CALCIUM BLD-MCNC: 8.6 MG/DL (ref 8.7–10.4)
CHLORIDE SERPL-SCNC: 109 MMOL/L (ref 98–112)
CO2 SERPL-SCNC: 20 MMOL/L (ref 21–32)
CREAT BLD-MCNC: 0.58 MG/DL
DEPRECATED RDW RBC AUTO: 42 FL (ref 35.1–46.3)
EGFRCR SERPLBLD CKD-EPI 2021: 116 ML/MIN/1.73M2 (ref 60–?)
EOSINOPHIL # BLD AUTO: 0.06 X10(3) UL (ref 0–0.7)
EOSINOPHIL NFR BLD AUTO: 1.6 %
ERYTHROCYTE [DISTWIDTH] IN BLOOD BY AUTOMATED COUNT: 13.2 % (ref 11–15)
GLUCOSE BLD-MCNC: 95 MG/DL (ref 70–99)
HCT VFR BLD AUTO: 33.6 %
HGB BLD-MCNC: 11.1 G/DL
IMM GRANULOCYTES # BLD AUTO: 0 X10(3) UL (ref 0–1)
IMM GRANULOCYTES NFR BLD: 0 %
LYMPHOCYTES # BLD AUTO: 2.1 X10(3) UL (ref 1–4)
LYMPHOCYTES NFR BLD AUTO: 55.1 %
MCH RBC QN AUTO: 28.8 PG (ref 26–34)
MCHC RBC AUTO-ENTMCNC: 33 G/DL (ref 31–37)
MCV RBC AUTO: 87 FL
MONOCYTES # BLD AUTO: 0.39 X10(3) UL (ref 0.1–1)
MONOCYTES NFR BLD AUTO: 10.2 %
NEUTROPHILS # BLD AUTO: 1.24 X10 (3) UL (ref 1.5–7.7)
NEUTROPHILS # BLD AUTO: 1.24 X10(3) UL (ref 1.5–7.7)
NEUTROPHILS NFR BLD AUTO: 32.6 %
OSMOLALITY SERPL CALC.SUM OF ELEC: 286 MOSM/KG (ref 275–295)
PLATELET # BLD AUTO: 245 10(3)UL (ref 150–450)
POTASSIUM SERPL-SCNC: 3.9 MMOL/L (ref 3.5–5.1)
RBC # BLD AUTO: 3.86 X10(6)UL
RH BLOOD TYPE: NEGATIVE
SODIUM SERPL-SCNC: 138 MMOL/L (ref 136–145)
WBC # BLD AUTO: 3.8 X10(3) UL (ref 4–11)

## 2024-04-29 PROCEDURE — 85025 COMPLETE CBC W/AUTO DIFF WBC: CPT | Performed by: STUDENT IN AN ORGANIZED HEALTH CARE EDUCATION/TRAINING PROGRAM

## 2024-04-29 PROCEDURE — 0JQC0ZZ REPAIR PELVIC REGION SUBCUTANEOUS TISSUE AND FASCIA, OPEN APPROACH: ICD-10-PCS | Performed by: STUDENT IN AN ORGANIZED HEALTH CARE EDUCATION/TRAINING PROGRAM

## 2024-04-29 PROCEDURE — 80048 BASIC METABOLIC PNL TOTAL CA: CPT | Performed by: STUDENT IN AN ORGANIZED HEALTH CARE EDUCATION/TRAINING PROGRAM

## 2024-04-29 PROCEDURE — 8E0W4CZ ROBOTIC ASSISTED PROCEDURE OF TRUNK REGION, PERCUTANEOUS ENDOSCOPIC APPROACH: ICD-10-PCS | Performed by: STUDENT IN AN ORGANIZED HEALTH CARE EDUCATION/TRAINING PROGRAM

## 2024-04-29 PROCEDURE — 86901 BLOOD TYPING SEROLOGIC RH(D): CPT | Performed by: STUDENT IN AN ORGANIZED HEALTH CARE EDUCATION/TRAINING PROGRAM

## 2024-04-29 PROCEDURE — 86900 BLOOD TYPING SEROLOGIC ABO: CPT | Performed by: STUDENT IN AN ORGANIZED HEALTH CARE EDUCATION/TRAINING PROGRAM

## 2024-04-29 PROCEDURE — 0USG4ZZ REPOSITION VAGINA, PERCUTANEOUS ENDOSCOPIC APPROACH: ICD-10-PCS | Performed by: STUDENT IN AN ORGANIZED HEALTH CARE EDUCATION/TRAINING PROGRAM

## 2024-04-29 PROCEDURE — 86850 RBC ANTIBODY SCREEN: CPT | Performed by: STUDENT IN AN ORGANIZED HEALTH CARE EDUCATION/TRAINING PROGRAM

## 2024-04-29 DEVICE — TRADITIONAL Y MESH
Type: IMPLANTABLE DEVICE | Site: BLADDER | Status: FUNCTIONAL
Brand: UPSYLON™

## 2024-04-29 RX ORDER — MORPHINE SULFATE 4 MG/ML
2 INJECTION, SOLUTION INTRAMUSCULAR; INTRAVENOUS EVERY 10 MIN PRN
Status: DISCONTINUED | OUTPATIENT
Start: 2024-04-29 | End: 2024-04-29

## 2024-04-29 RX ORDER — ROCURONIUM BROMIDE 10 MG/ML
INJECTION, SOLUTION INTRAVENOUS AS NEEDED
Status: DISCONTINUED | OUTPATIENT
Start: 2024-04-29 | End: 2024-04-29 | Stop reason: SURG

## 2024-04-29 RX ORDER — NALOXONE HYDROCHLORIDE 0.4 MG/ML
80 INJECTION, SOLUTION INTRAMUSCULAR; INTRAVENOUS; SUBCUTANEOUS AS NEEDED
Status: DISCONTINUED | OUTPATIENT
Start: 2024-04-29 | End: 2024-04-29

## 2024-04-29 RX ORDER — METOCLOPRAMIDE HYDROCHLORIDE 5 MG/ML
10 INJECTION INTRAMUSCULAR; INTRAVENOUS ONCE
Status: COMPLETED | OUTPATIENT
Start: 2024-04-29 | End: 2024-04-29

## 2024-04-29 RX ORDER — ONDANSETRON 2 MG/ML
INJECTION INTRAMUSCULAR; INTRAVENOUS AS NEEDED
Status: DISCONTINUED | OUTPATIENT
Start: 2024-04-29 | End: 2024-04-29 | Stop reason: SURG

## 2024-04-29 RX ORDER — DOCUSATE SODIUM 100 MG/1
100 CAPSULE, LIQUID FILLED ORAL 2 TIMES DAILY
COMMUNITY

## 2024-04-29 RX ORDER — MORPHINE SULFATE 10 MG/ML
6 INJECTION, SOLUTION INTRAMUSCULAR; INTRAVENOUS EVERY 10 MIN PRN
Status: DISCONTINUED | OUTPATIENT
Start: 2024-04-29 | End: 2024-04-29

## 2024-04-29 RX ORDER — BUPIVACAINE HYDROCHLORIDE 2.5 MG/ML
INJECTION, SOLUTION EPIDURAL; INFILTRATION; INTRACAUDAL AS NEEDED
Status: DISCONTINUED | OUTPATIENT
Start: 2024-04-29 | End: 2024-04-29 | Stop reason: HOSPADM

## 2024-04-29 RX ORDER — ACETAMINOPHEN 500 MG
1000 TABLET ORAL ONCE
Status: COMPLETED | OUTPATIENT
Start: 2024-04-29 | End: 2024-04-29

## 2024-04-29 RX ORDER — ONDANSETRON 2 MG/ML
4 INJECTION INTRAMUSCULAR; INTRAVENOUS EVERY 6 HOURS PRN
Status: DISCONTINUED | OUTPATIENT
Start: 2024-04-29 | End: 2024-04-29

## 2024-04-29 RX ORDER — LIDOCAINE HYDROCHLORIDE 10 MG/ML
INJECTION, SOLUTION EPIDURAL; INFILTRATION; INTRACAUDAL; PERINEURAL AS NEEDED
Status: DISCONTINUED | OUTPATIENT
Start: 2024-04-29 | End: 2024-04-29 | Stop reason: SURG

## 2024-04-29 RX ORDER — SCOLOPAMINE TRANSDERMAL SYSTEM 1 MG/1
1 PATCH, EXTENDED RELEASE TRANSDERMAL ONCE
Status: DISCONTINUED | OUTPATIENT
Start: 2024-04-29 | End: 2024-04-29 | Stop reason: HOSPADM

## 2024-04-29 RX ORDER — OXYCODONE HYDROCHLORIDE 5 MG/1
5 TABLET ORAL EVERY 4 HOURS PRN
Qty: 6 TABLET | Refills: 0 | Status: SHIPPED | OUTPATIENT
Start: 2024-04-29 | End: 2024-05-04

## 2024-04-29 RX ORDER — HYDROMORPHONE HYDROCHLORIDE 1 MG/ML
0.4 INJECTION, SOLUTION INTRAMUSCULAR; INTRAVENOUS; SUBCUTANEOUS EVERY 5 MIN PRN
Status: DISCONTINUED | OUTPATIENT
Start: 2024-04-29 | End: 2024-04-29

## 2024-04-29 RX ORDER — DEXAMETHASONE SODIUM PHOSPHATE 4 MG/ML
VIAL (ML) INJECTION AS NEEDED
Status: DISCONTINUED | OUTPATIENT
Start: 2024-04-29 | End: 2024-04-29 | Stop reason: SURG

## 2024-04-29 RX ORDER — MAGNESIUM CARB/ALUMINUM HYDROX 105-160MG
296 TABLET,CHEWABLE ORAL ONCE
COMMUNITY

## 2024-04-29 RX ORDER — LABETALOL HYDROCHLORIDE 5 MG/ML
INJECTION, SOLUTION INTRAVENOUS AS NEEDED
Status: DISCONTINUED | OUTPATIENT
Start: 2024-04-29 | End: 2024-04-29 | Stop reason: SURG

## 2024-04-29 RX ORDER — NALOXONE HYDROCHLORIDE 4 MG/.1ML
4 SPRAY NASAL AS NEEDED
Qty: 1 KIT | Refills: 0 | Status: SHIPPED | OUTPATIENT
Start: 2024-04-29

## 2024-04-29 RX ORDER — FAMOTIDINE 10 MG/ML
20 INJECTION, SOLUTION INTRAVENOUS ONCE
Status: COMPLETED | OUTPATIENT
Start: 2024-04-29 | End: 2024-04-29

## 2024-04-29 RX ORDER — PHENAZOPYRIDINE HYDROCHLORIDE 200 MG/1
200 TABLET, FILM COATED ORAL ONCE
Status: COMPLETED | OUTPATIENT
Start: 2024-04-29 | End: 2024-04-29

## 2024-04-29 RX ORDER — CEFAZOLIN SODIUM/WATER 2 G/20 ML
2 SYRINGE (ML) INTRAVENOUS ONCE
Status: COMPLETED | OUTPATIENT
Start: 2024-04-29 | End: 2024-04-29

## 2024-04-29 RX ORDER — FAMOTIDINE 20 MG/1
20 TABLET, FILM COATED ORAL ONCE
Status: COMPLETED | OUTPATIENT
Start: 2024-04-29 | End: 2024-04-29

## 2024-04-29 RX ORDER — MORPHINE SULFATE 4 MG/ML
4 INJECTION, SOLUTION INTRAMUSCULAR; INTRAVENOUS EVERY 10 MIN PRN
Status: DISCONTINUED | OUTPATIENT
Start: 2024-04-29 | End: 2024-04-29

## 2024-04-29 RX ORDER — HYDROMORPHONE HYDROCHLORIDE 1 MG/ML
0.2 INJECTION, SOLUTION INTRAMUSCULAR; INTRAVENOUS; SUBCUTANEOUS EVERY 5 MIN PRN
Status: DISCONTINUED | OUTPATIENT
Start: 2024-04-29 | End: 2024-04-29

## 2024-04-29 RX ORDER — KETOROLAC TROMETHAMINE 30 MG/ML
INJECTION, SOLUTION INTRAMUSCULAR; INTRAVENOUS AS NEEDED
Status: DISCONTINUED | OUTPATIENT
Start: 2024-04-29 | End: 2024-04-29 | Stop reason: SURG

## 2024-04-29 RX ORDER — HEPARIN SODIUM 5000 [USP'U]/ML
5000 INJECTION, SOLUTION INTRAVENOUS; SUBCUTANEOUS ONCE
Status: COMPLETED | OUTPATIENT
Start: 2024-04-29 | End: 2024-04-29

## 2024-04-29 RX ORDER — METOCLOPRAMIDE 10 MG/1
10 TABLET ORAL ONCE
Status: COMPLETED | OUTPATIENT
Start: 2024-04-29 | End: 2024-04-29

## 2024-04-29 RX ORDER — MIDAZOLAM HYDROCHLORIDE 1 MG/ML
INJECTION INTRAMUSCULAR; INTRAVENOUS AS NEEDED
Status: DISCONTINUED | OUTPATIENT
Start: 2024-04-29 | End: 2024-04-29 | Stop reason: SURG

## 2024-04-29 RX ORDER — SODIUM CHLORIDE 9 MG/ML
INJECTION, SOLUTION INTRAVENOUS CONTINUOUS PRN
Status: DISCONTINUED | OUTPATIENT
Start: 2024-04-29 | End: 2024-04-29 | Stop reason: SURG

## 2024-04-29 RX ORDER — KETOROLAC TROMETHAMINE 10 MG/1
10 TABLET, FILM COATED ORAL EVERY 6 HOURS PRN
Qty: 20 TABLET | Refills: 0 | Status: SHIPPED | OUTPATIENT
Start: 2024-04-29 | End: 2024-05-04

## 2024-04-29 RX ORDER — HYDROMORPHONE HYDROCHLORIDE 1 MG/ML
0.6 INJECTION, SOLUTION INTRAMUSCULAR; INTRAVENOUS; SUBCUTANEOUS EVERY 5 MIN PRN
Status: DISCONTINUED | OUTPATIENT
Start: 2024-04-29 | End: 2024-04-29

## 2024-04-29 RX ORDER — SODIUM CHLORIDE, SODIUM LACTATE, POTASSIUM CHLORIDE, CALCIUM CHLORIDE 600; 310; 30; 20 MG/100ML; MG/100ML; MG/100ML; MG/100ML
INJECTION, SOLUTION INTRAVENOUS CONTINUOUS
Status: DISCONTINUED | OUTPATIENT
Start: 2024-04-29 | End: 2024-04-29

## 2024-04-29 RX ORDER — PROCHLORPERAZINE EDISYLATE 5 MG/ML
5 INJECTION INTRAMUSCULAR; INTRAVENOUS EVERY 8 HOURS PRN
Status: DISCONTINUED | OUTPATIENT
Start: 2024-04-29 | End: 2024-04-29

## 2024-04-29 RX ADMIN — SODIUM CHLORIDE, SODIUM LACTATE, POTASSIUM CHLORIDE, CALCIUM CHLORIDE: 600; 310; 30; 20 INJECTION, SOLUTION INTRAVENOUS at 09:43:00

## 2024-04-29 RX ADMIN — SODIUM CHLORIDE: 9 INJECTION, SOLUTION INTRAVENOUS at 10:30:00

## 2024-04-29 RX ADMIN — ONDANSETRON 4 MG: 2 INJECTION INTRAMUSCULAR; INTRAVENOUS at 08:26:00

## 2024-04-29 RX ADMIN — KETOROLAC TROMETHAMINE 30 MG: 30 INJECTION, SOLUTION INTRAMUSCULAR; INTRAVENOUS at 11:16:00

## 2024-04-29 RX ADMIN — LABETALOL HYDROCHLORIDE 2.5 MG: 5 INJECTION, SOLUTION INTRAVENOUS at 09:25:00

## 2024-04-29 RX ADMIN — LABETALOL HYDROCHLORIDE 2.5 MG: 5 INJECTION, SOLUTION INTRAVENOUS at 09:00:00

## 2024-04-29 RX ADMIN — SODIUM CHLORIDE: 9 INJECTION, SOLUTION INTRAVENOUS at 07:51:00

## 2024-04-29 RX ADMIN — ROCURONIUM BROMIDE 20 MG: 10 INJECTION, SOLUTION INTRAVENOUS at 08:49:00

## 2024-04-29 RX ADMIN — ROCURONIUM BROMIDE 10 MG: 10 INJECTION, SOLUTION INTRAVENOUS at 10:13:00

## 2024-04-29 RX ADMIN — CEFAZOLIN SODIUM/WATER 2 G: 2 G/20 ML SYRINGE (ML) INTRAVENOUS at 08:06:00

## 2024-04-29 RX ADMIN — SODIUM CHLORIDE, SODIUM LACTATE, POTASSIUM CHLORIDE, CALCIUM CHLORIDE: 600; 310; 30; 20 INJECTION, SOLUTION INTRAVENOUS at 07:34:00

## 2024-04-29 RX ADMIN — DEXAMETHASONE SODIUM PHOSPHATE 8 MG: 4 MG/ML VIAL (ML) INJECTION at 08:26:00

## 2024-04-29 RX ADMIN — ROCURONIUM BROMIDE 5 MG: 10 INJECTION, SOLUTION INTRAVENOUS at 07:40:00

## 2024-04-29 RX ADMIN — ROCURONIUM BROMIDE 20 MG: 10 INJECTION, SOLUTION INTRAVENOUS at 08:41:00

## 2024-04-29 RX ADMIN — LABETALOL HYDROCHLORIDE 2.5 MG: 5 INJECTION, SOLUTION INTRAVENOUS at 10:34:00

## 2024-04-29 RX ADMIN — MIDAZOLAM HYDROCHLORIDE 1 MG: 1 INJECTION INTRAMUSCULAR; INTRAVENOUS at 07:34:00

## 2024-04-29 RX ADMIN — ROCURONIUM BROMIDE 45 MG: 10 INJECTION, SOLUTION INTRAVENOUS at 08:00:00

## 2024-04-29 RX ADMIN — SODIUM CHLORIDE, SODIUM LACTATE, POTASSIUM CHLORIDE, CALCIUM CHLORIDE: 600; 310; 30; 20 INJECTION, SOLUTION INTRAVENOUS at 11:36:00

## 2024-04-29 RX ADMIN — LIDOCAINE HYDROCHLORIDE 50 MG: 10 INJECTION, SOLUTION EPIDURAL; INFILTRATION; INTRACAUDAL; PERINEURAL at 07:40:00

## 2024-04-29 NOTE — ANESTHESIA POSTPROCEDURE EVALUATION
Patient: Wanda Johnson    Procedure Summary       Date: 04/29/24 Room / Location: [facility] MAIN OR  / [facility] MAIN OR    Anesthesia Start: 0734 Anesthesia Stop:     Procedures:       XI complicated robotic assisted sacrocolopopexy, posterior repair, perineorrhaphy, cystoscopy (Abdomen)      POSTERIOR REPAIR (Vagina )      CYSTOSCOPY (Bladder) Diagnosis: (Vaginal vault prolapse, rectocele, cystocele)    Surgeons: Manisha Berger DO Anesthesiologist: Alfie Mccauley MD    Anesthesia Type: general ASA Status: 2            Anesthesia Type: general    Vitals Value Taken Time   /73 04/29/24 1155   Temp 98.6 04/29/24 1203   Pulse 71 04/29/24 1202   Resp 15 04/29/24 1202   SpO2 100 % 04/29/24 1202   Vitals shown include unfiled device data.    [facility] AN Post Evaluation:   Patient Evaluated in PACU  Patient Participation: complete - patient participated  Level of Consciousness: awake and alert  Pain Score: 0  Pain Management: adequate  Airway Patency:patent  Yes    Nausea/Vomiting: none  Cardiovascular Status: acceptable  Respiratory Status: acceptable  Postoperative Hydration acceptable      Manfred Garg MD  4/29/2024 12:03 PM

## 2024-04-29 NOTE — INTERVAL H&P NOTE
Pre-op Diagnosis: Vaginal vault prolapse, rectocele, cystocele    The above referenced H&P was reviewed by Manisha Berger DO on 4/29/2024, the patient was examined and no significant changes have occurred in the patient's condition since the H&P was performed.  I discussed with the patient and/or legal representative the potential benefits, risks and side effects of this procedure; the likelihood of the patient achieving goals; and potential problems that might occur during recuperation.  I discussed reasonable alternatives to the procedure, including risks, benefits and side effects related to the alternatives and risks related to not receiving this procedure.  We will proceed with procedure as planned.

## 2024-04-29 NOTE — ANESTHESIA PROCEDURE NOTES
Airway  Date/Time: 4/29/2024 7:44 AM  Urgency: Elective      General Information and Staff    Patient location during procedure: OR  Anesthesiologist: Alfie Mccauley MD  Resident/CRNA: Johnathan Putnam CRNA  Performed: CRNA   Performed by: Johnathan Putnam CRNA  Authorized by: Alfie Mccauley MD      Indications and Patient Condition  Indications for airway management: anesthesia  Sedation level: deep  Preoxygenated: yes  Patient position: sniffing  Mask difficulty assessment: 0 - not attempted    Final Airway Details  Final airway type: endotracheal airway      Successful airway: ETT  Cuffed: yes   Successful intubation technique: Video laryngoscopy  Endotracheal tube insertion site: oral  Blade: Gualberto  Blade size: #3  ETT size (mm): 7.0    Cormack-Lehane Classification: grade I - full view of glottis  Placement verified by: capnometry   Measured from: teeth  ETT to teeth (cm): 21  Number of attempts at approach: 1    Additional Comments  7.0 ETT placed easily with Paulino 3 blade.

## 2024-04-29 NOTE — ANESTHESIA PREPROCEDURE EVALUATION
Anesthesia PreOp Note    HPI:     Wanda Johnson is a 43 year old female who presents for preoperative consultation requested by: Manisha Berger DO    Date of Surgery: 2024    Procedure(s):  XI robotic assisted sacrocolpopexy, possible posterior repair, cystoscopy  ANTERIOR POSTERIOR REPAIR  CYSTOSCOPY  Indication: Vaginal vault prolapse, rectocele, cystocele    Relevant Problems   No relevant active problems   Left leg numbness still present. Type and screen/cbc/bmp to be sent off     NPO:  Last Liquid Consumption Date: 24  Last Liquid Consumption Time:   Last Solid Consumption Date: 24  Last Solid Consumption Time:   Last Liquid Consumption Date: 24          History Review:  There are no problems to display for this patient.      Past Medical History:    Back problem    lower back fusion and laminectomy    Depression    Hypothyroid    Muscle weakness    left leg numbness and weakness- spinal caudal equina    Neuropathy    left leg       Past Surgical History:   Procedure Laterality Date    Appendectomy      Colonoscopy  2024    ; Hemorrhoids    Colonoscopy N/A 2024    Procedure: COLONOSCOPY;  Surgeon: Alon Philip MD;  Location: [facility] ENDOSCOPY    Hysterectomy  2023          Other surgical history      microdisectomy    Rotator cuff repair  2003    Spine surgery procedure unlisted         Medications Prior to Admission   Medication Sig Dispense Refill Last Dose    docusate sodium 100 MG Oral Cap Take 1 capsule (100 mg total) by mouth 2 (two) times daily.   2024 at 2330    magnesium citrate 1.745 GM/30ML Oral Solution Take 296 mL by mouth once. 1/2 bottle on 24 in afternoon   2024 at 1400    diphenhydrAMINE-APAP, sleep, (TYLENOL PM EXTRA STRENGTH)  MG Oral Tab Apply 1 Application topically as needed.   2024 at 2330    oxyCODONE 5 MG Oral Tab Take 1 tablet (5 mg total) by mouth every 12 (twelve) hours as needed.   2024     magnesium hydroxide (MILK OF MAGNESIA) 400 MG/5ML Oral Suspension 15 mL daily as needed.   4/24/2024    amoxicillin 500 MG Oral Cap Take 1 capsule (500 mg total) by mouth 2 (two) times daily. uti   4/27/2024    baclofen 10 MG Oral Tab TAKE 1 TABLET BY MOUTH EVERY NIGHT AT BEDTIME AS NEEDED FOR SPASM   4/28/2024 at 2230    levothyroxine 25 MCG Oral Tab levothyroxine 25 mcg tablet   TAKE 1 TABLET BY MOUTH EVERY DAY. REPEAT LABS   4/28/2024 at 0800    senna 8.8 MG/5ML Oral Syrup Take 5 mL (8.8 mg total) by mouth 2 (two) times daily.   4/24/2024    Wheat Dextrin (BENEFIBER) Oral Chew Tab Chew by mouth.   3/25/2024     Current Facility-Administered Medications Ordered in Epic   Medication Dose Route Frequency Provider Last Rate Last Admin    lactated ringers infusion   Intravenous Continuous Manisha Berger DO 20 mL/hr at 04/29/24 0638 Restarted at 04/29/24 0734    [Transfer Hold] scopolamine (Transderm-Scop) 1 MG/3DAYS patch 1 patch  1 patch Transdermal Once Manisha Berger DO        midazolam (Versed) 2 MG/2ML injection   Intravenous PRN Johnathan Putnam CRNA   1 mg at 04/29/24 0734    fentaNYL (Sublimaze) 50 mcg/mL injection   Intravenous PRN Johnathan Putnam CRNA   50 mcg at 04/29/24 0833    lidocaine PF (Xylocaine-MPF) 1% injection   Intravenous PRN Johnathan Putnam CRNA   50 mg at 04/29/24 0740    propofol (Diprivan) 10 MG/ML injection   Intravenous PRN Johnathan Putnam CRNA   250 mg at 04/29/24 0740    succinylcholine (Anectine) 20 MG/ML injection   Intravenous PRN Johnathan Putnam CRNA   140 mg at 04/29/24 0740    rocuronium (Zemuron) 50 mg/5mL injection   Intravenous PRN Johnathan Putnam CRNA   20 mg at 04/29/24 0841    dexamethasone (Decadron) 4 MG/ML injection   Intravenous PRN Johnathan Putnam CRNA   8 mg at 04/29/24 0826    ondansetron (Zofran) 4 MG/2ML injection   Intravenous PRN Johnathan Putnam CRNA   4 mg at 04/29/24 0826    sodium chloride 0.9% infusion   Intravenous Continuous PRN Johnathan Putnam, SIRISHA   New  Bag at 04/29/24 0751     Current Outpatient Medications Ordered in Epic   Medication Sig Dispense Refill    oxyCODONE 5 MG Oral Tab Take 1 tablet (5 mg total) by mouth every 4 (four) hours as needed for Pain. 6 tablet 0    Naloxone HCl 4 MG/0.1ML Nasal Liquid 4 mg by Nasal route as needed. If patient remains unresponsive, repeat dose in other nostril 2-5 minutes after first dose. 1 kit 0    Ketorolac Tromethamine 10 MG Oral Tab Take 1 tablet (10 mg total) by mouth every 6 (six) hours as needed. 20 tablet 0       Allergies   Allergen Reactions    Bee Venom ANAPHYLAXIS    Chlorhexidine RASH    Honey Bee Venom ANAPHYLAXIS    Adhesive Tape OTHER (SEE COMMENTS)     Tegaderm swelling and itching     Tramadol UNKNOWN and OTHER (SEE COMMENTS)     Nausea flu like symptoms     Vicodin [Hydrocodone-Acetaminophen] UNKNOWN     Nausea and flu like symptoms    Tolerates: Oxycodone and tylenol        History reviewed. No pertinent family history.  Social History     Socioeconomic History    Marital status:    Tobacco Use    Smoking status: Never    Smokeless tobacco: Never   Substance and Sexual Activity    Alcohol use: Never    Drug use: Never       Available pre-op labs reviewed.  Lab Results   Component Value Date    WBC 3.8 (L) 04/29/2024    RBC 3.86 04/29/2024    HGB 11.1 (L) 04/29/2024    HCT 33.6 (L) 04/29/2024    MCV 87.0 04/29/2024    MCH 28.8 04/29/2024    MCHC 33.0 04/29/2024    RDW 13.2 04/29/2024    .0 04/29/2024     Lab Results   Component Value Date     04/29/2024    K 3.9 04/29/2024     04/29/2024    CO2 20.0 (L) 04/29/2024    BUN 13 04/29/2024    CREATSERUM 0.58 04/29/2024    GLU 95 04/29/2024    CA 8.6 (L) 04/29/2024          Vital Signs:  Body mass index is 37.5 kg/m².   height is 1.6 m (5' 3\") and weight is 96 kg (211 lb 11.2 oz). Her temperature is 97.9 °F (36.6 °C). Her blood pressure is 112/79 and her pulse is 80. Her respiration is 20 and oxygen saturation is 97%.   Vitals:     04/25/24 1035 04/29/24 0606   BP:  112/79   Pulse:  80   Resp:  20   Temp:  97.9 °F (36.6 °C)   SpO2:  97%   Weight: 98 kg (216 lb) 96 kg (211 lb 11.2 oz)   Height: 1.6 m (5' 3\") 1.6 m (5' 3\")        Anesthesia Evaluation     Patient summary reviewed and Nursing notes reviewed    No history of anesthetic complications   Airway   Mallampati: II  TM distance: >3 FB  Neck ROM: full  Dental      Pulmonary - negative ROS    breath sounds clear to auscultation  (-) COPD, asthma, sleep apnea  Cardiovascular - negative ROS  Exercise tolerance: good  (-) hypertension, CAD, CHF    Rhythm: regular  Rate: normal    Neuro/Psych - negative ROS   (-) CVA    GI/Hepatic/Renal - negative ROS   (-) GERD, liver disease, renal disease    Endo/Other    (+) hypothyroidism  (-) diabetes mellitus  Abdominal   (+) obese                 Anesthesia Plan:   ASA:  2  Plan:   General  Airway:  ETT and Video laryngoscope  Post-op Pain Management: IV analgesics and Oral pain medication  Informed Consent Plan and Risks Discussed With:  Patient  Discussed plan with:  CRNA      I have informed Wanda Johnson and/or legal guardian or family member of the nature of the anesthetic plan, benefits, risks including possible dental damage if relevant, major complications, and any alternative forms of anesthetic management.   All of the patient's questions were answered to the best of my ability. The patient desires the anesthetic management as planned.  Alfie Mccauley MD  4/29/2024 07:20 AM  Present on Admission:  **None**

## 2024-04-29 NOTE — OPERATIVE REPORT
[facility]  part of [facility]    Operative Note         Wanda Johnson Location: OR   [facility] [id number] MRN [id number]   Admission Date 4/29/2024 Operation Date 4/29/2024   Attending Physician Manisha Berger DO       Patient Name: Wanda Johnson     Preoperative Diagnosis: Vaginal vault prolapse, rectocele, cystocele     Postoperative Diagnosis: Vaginal vault prolapse, rectocele, cystocele     Procedure(s):  XI complicated robotic assisted sacrocolopopexy, posterior repair, perineorrhaphy, cystoscopy      Primary Surgeon: Manisha Berger DO     Surgical Assistant.: Nomi Mckinney     Anesthesia: General     Specimen: * No specimens in log *     Estimated Blood Loss: Blood Output: 30 mL (4/29/2024 11:16 AM)  Complications: none      Surgical Findings:   -Large redundant vagina with scarring making dissection difficult  -At the end of the case excellent reduction of prolapse     Complexity: Complex given severe obesity and difficulty gaining excess into vagina as well as from scarring and large redundant vagina    Operative Summary:      44yo female with bothersome stage III prolapse. She has a history of hysterectomy in the past. She was counseled on management options for the prolapse including abdominal and vaginal approaches. She elected to proceed in fully informed fashion after discussion of procedures, alternatives, benefits, and risks.    The patient was brought into the room. A preoperative huddle was performed. The patient and procedure were identified.  Perioperative Ancef 2 grams IV and SQH was administered. SCDs were placed and turned on. General anesthesia was administered. The patient was placed in the lithotomy position. Her arms were tucked and padded. Her chest was taped down with a pad. The patient was then prepped and draped in the usual surgical fashion.      An 16-Fr catheter was placed in the bladder. We began gaining access to the abdomen with a Veress needle in the  midline however secondary to severe obesity. I even tried sanchez's point with no success. I decided to change to the Peng technique and grabbed the fascia with 2 kochers. After cutting the fascia in the midline, I was able to blunty enter the peritoneum. Even the Hasaan port did not give us good insufflation at first even with direct visual entry. Eventually we obtained good insufflation and the rest of the ports were able to be placed. The abdomen was insufflated and an 8mm port was then introduced. The abdominal cavity was inspected with a 30-degree camera. Two additional 8 mm robotic ports were placed on the patient's left side. An additional robotic port was placed on the patient's right side and an 8 mm assistant airseal port. The skin was infiltrated with Marcaine prior to an incision at each port site. Each port was then visually watched in. Port sites were at least 8-9 cm away from each other in a straight across configuration. Airseal was used for the case. Once all ports were in place the robot was docked from the side.     Once the robotic arms were docked, monopolar benito, bipolar graspers and a prograsp were placed in the arms.     The posterior peritoneum overlying the sacral promontory was incised and the anterior longitudinal ligament identified. This was prolonged as she had excessive fat.  The peritoneal incision was extended distally along the midline to form the trench where behind with the implanted mesh would be ultimately placed.      Attention was then turned to the vaginal cuff.  A vaginal sizer was used to easily manipulate the structure.  The overlying peritoneum was incised and the bladder bluntly dissected down to the level of the trigone off of the anterior vaginal wall.  The was also repeated posteriorly to mobilize the cuff off of the rectum as well. Again this dissection was long secondary to scarring and a large redundant vagina.    Once these areas were developed, Y-shaped mesh  was fashioned, leaving the anterior arm of 6 cm and the posterior arm of 10 cm. This was delivered into the abdomen. Using 2-0 PDS, the Y mesh was affixed to the anterior vaginal wall and remaining cervix at apex. The posterior arm of the mesh was fixated to the posterior vaginal wall and apex. Once these were secured we laid the mesh on our previously developed peritoneal trough to the anterior longitudinal ligament. The malleable was placed in the vagina to determine the mesh fixation point and a vaginal exam confirmed adequate prolapse reduction without hypersuspension.  Once that was evaluated, we then placed 0-0 Prolene to fix the mesh to the sacral promontory. The mesh was sutured at two places to the sacral promontory.  We then retroperitonealized the mesh using 0-0 V-lock suture in a running fashion. No bleeding was seen on inspection.    We then ensured good abdominal and vaginal hemostasis.  The abdomen was de-sufflated. The fascia was closed with an 0-0 Vicryl suture.. The skin was closed with 4-0 monocryl and dressed with dermabond after additional local anesthetic was administered.    Next, attention was then turned to the posterior repair & perineorrhaphy. Two Allis clamps were placed along the hymenal ring at the posterior aspect.  Lidocaine with epi were injected below the vaginal epithelium in the area of the rectocele and widened hiatus. A vertical incision was made through the vaginal epithelium with knife. The incision was extended to an area superior to the rectocele defect. Next, the vaginal epithelium was dissected away from the underlying fibromuscular layer bilaterally until the lateral edge of the rectocele was identified. The rectocele was then repaired by plicating the rectovaginal tissue in the midline using 0-0 Vicryl in a serial fashion to create a new ridge of tissue overlying the distal portion of the rectocele defect. The perineal body was rebuilt also with 0-0 Vicryl. Next, the  vaginal and perineal incision was closed using running locked suture of 2-0 Vicryl.  The vaginal vault was hemostatic at case conclusion. Vaginal sweep negative.     All sponge, instrument, and needle counts were reported as correct. Huang catheter was left in place and will be removed in PACU.     The patient was cleaned, dried, and repositioned supine. She was extubated and transported to the recovery room for further postoperative monitoring.      Implants:   Implant Name Type Inv. Item Serial No.  Lot No. LRB No. Used Action   MESH RICHARD VAG  SULEMAN Y LTWT LO SURF AREA - SNA  MESH RICHARD VAG  SULEMAN Y LTWT LO SURF AREA NA [employer] WD [id number]  1 Implanted        Drains: 16F huang catheter to PACU     Condition: stable       Manisha Berger DO

## (undated) DEVICE — VIOLET BRAIDED (POLYGLACTIN 910), SYNTHETIC ABSORBABLE SUTURE: Brand: COATED VICRYL

## (undated) DEVICE — SUT PDS II 2-0 27IN ABSRB VLT L26MM CT-2

## (undated) DEVICE — MEGA SUTURECUT ND: Brand: ENDOWRIST

## (undated) DEVICE — BIPOLAR GRASPER, LONG: Brand: ENDOWRIST

## (undated) DEVICE — JELLY,LUBE,STERILE,FLIP TOP,TUBE,2-OZ: Brand: MEDLINE

## (undated) DEVICE — SOLUTION IRRIG 1000ML ST H2O AQUALITE PLAS

## (undated) DEVICE — ARM DRAPE

## (undated) DEVICE — SUT VCRL 0 L27IN ABSRB VLT L26MM UR-6 5/8-ZZDISC-USE 421016

## (undated) DEVICE — SUT MCRYL 4-0 18IN PS-2 ABSRB UD 19MM 3/8 CIR

## (undated) DEVICE — SUT MCRYL 4-0 27IN ABSRB UD L24MM PS-1

## (undated) DEVICE — SET IRRIG L96IN POST OP W/ NVENT 2ND PIERCING

## (undated) DEVICE — TRAY CATH FOLEY 16FR INCLUDE BARDX IC COMPLT CARE

## (undated) DEVICE — TRAY SKIN PREP PVP-1

## (undated) DEVICE — MONOPOLAR CURVED SCISSORS: Brand: ENDOWRIST

## (undated) DEVICE — TIP COVER ACCESSORY

## (undated) DEVICE — COLUMN DRAPE

## (undated) DEVICE — ELECTRODE ES RET 2 PATE W/ 10FT CRD MPLR DISP

## (undated) DEVICE — YANKAUER,FLEXIBLE HANDLE,REGLR CAPACITY: Brand: MEDLINE INDUSTRIES, INC.

## (undated) DEVICE — SEAL

## (undated) DEVICE — SOLUTION IV 1000ML DIL ST H2O

## (undated) DEVICE — GLOVE SUR 7 SENSICARE PI PIP GRN PWD F

## (undated) DEVICE — 60 ML SYRINGE REGULAR TIP: Brand: MONOJECT

## (undated) DEVICE — DRAPE,LITHOTOMY,STERILE: Brand: MEDLINE

## (undated) DEVICE — SLEEVE COMPR MD KNEE LEN SGL USE KENDALL SCD

## (undated) DEVICE — KIT CLEAN ENDOKIT 1.1OZ GOWNX2

## (undated) DEVICE — VISUALIZATION SYSTEM: Brand: CLEARIFY

## (undated) DEVICE — CANNULA SEAL

## (undated) DEVICE — INSUFFLATION NEEDLE TO ESTABLISH PNEUMOPERITONEUM.: Brand: INSUFFLATION NEEDLE

## (undated) DEVICE — GAMMEX® PI HYBRID SIZE 6, STERILE POWDER-FREE SURGICAL GLOVE, POLYISOPRENE AND NEOPRENE BLEND: Brand: GAMMEX

## (undated) DEVICE — ABSORBABLE WOUND CLOSURE DEVICE: Brand: V-LOC 180

## (undated) DEVICE — BLADELESS OBTURATOR: Brand: WECK VISTA

## (undated) DEVICE — KIT ENDO ORCAPOD 160/180/190

## (undated) DEVICE — SOLUTION IRRIG 3000ML 0.9% NACL FLX CONT

## (undated) DEVICE — LARGE NEEDLE DRIVER: Brand: ENDOWRIST

## (undated) DEVICE — DRAPE,ROBOTICS,STERILE: Brand: MEDLINE

## (undated) DEVICE — REDUCER: Brand: ENDOWRIST

## (undated) DEVICE — SOLUTION IRRIG 1000ML 0.9% NACL USP BTL

## (undated) DEVICE — ADHESIVE SKIN TOP FOR WND CLSR DERMBND ADV

## (undated) DEVICE — STERILE H2O FOR IRRIG .

## (undated) DEVICE — SUT COAT VCRL 0 27IN CT-1 ABSRB VLT 36MM 1/2

## (undated) DEVICE — SUT PROL 0 30IN CT-2 NABSRB BLU L26MM 1/2 CIR

## (undated) DEVICE — GLOVE SUR 6.5 SENSICARE PI PIP CRM PWD F

## (undated) DEVICE — DRAPE,UNDRBUT,WHT GRAD PCH,CAPPORT,20/CS: Brand: MEDLINE

## (undated) DEVICE — NDLCTR: FOAM/ADHESIVE 10CT 96/CS: Brand: MEDICAL ACTION INDUSTRIES

## (undated) DEVICE — INTENT TO BE USED WITH SUTURE MATERIAL FOR TISSUE CLOSURE: Brand: RICHARD-ALLAN® NEEDLE 3/8 CIRCLE TROCAR

## (undated) DEVICE — MEDI-VAC NON-CONDUCTIVE SUCTION TUBING 6MM X 1.8M (6FT.) L: Brand: CARDINAL HEALTH

## (undated) DEVICE — Device: Brand: DUAL NARE NASAL CANNULAE FEMALE LUER CON 7FT O2 TUBE

## (undated) DEVICE — HOOK RETRCT 5MM SHRP E STAY DISP LONE STAR

## (undated) DEVICE — 20 ML SYRINGE LUER-LOCK TIP: Brand: MONOJECT

## (undated) DEVICE — SKIN PREP TRAY 4 COMPARTM TRAY: Brand: MEDLINE INDUSTRIES, INC.

## (undated) DEVICE — ROBOTIC: Brand: MEDLINE INDUSTRIES, INC.

## (undated) DEVICE — GAUZE,SPONGE,4"X4",16PLY,XRAY,STRL,LF: Brand: MEDLINE

## (undated) NOTE — LETTER
[facility]  [address], [place], [place]  Authorization for Surgical Operation and Procedure                                                                                           I hereby authorize Alon Philip MD, my physician and his/her assistants (if applicable), which may include medical students, residents, and/or fellows, to perform the following surgical operation/ procedure and administer such anesthesia as may be determined necessary by my physician: Operation/Procedure name (s) COLONOSCOPY on Wanda Johnson   2.   I recognize that during the surgical operation/procedure, unforeseen conditions may necessitate additional or different procedures than those listed above.  I, therefore, further authorize and request that the above-named surgeon, assistants, or designees perform such procedures as are, in their judgment, necessary and desirable.    3.   My surgeon/physician has discussed prior to my surgery the potential benefits, risks and side effects of this procedure; the likelihood of achieving goals; and potential problems that might occur during recuperation.  They also discussed reasonable alternatives to the procedure, including risks, benefits, and side effects related to the alternatives and risks related to not receiving this procedure.  I have had all my questions answered and I acknowledge that no guarantee has been made as to the result that may be obtained.    4.   Should the need arise during my operation/procedure, which includes change of level of care prior to discharge, I also consent to the administration of blood and/or blood products.  Further, I understand that despite careful testing and screening of blood or blood products by collecting agencies, I may still be subject to ill effects as a result of receiving a blood transfusion and/or blood products.  The following are some, but not all, of the potential risks that can occur: fever and allergic  reactions, hemolytic reactions, transmission of diseases such as Hepatitis, AIDS and Cytomegalovirus (CMV) and fluid overload.  In the event that I wish to have an autologous transfusion of my own blood, or a directed donor transfusion, I will discuss this with my physician.  Check only if Refusing Blood or Blood Products  I understand refusal of blood or blood products as deemed necessary by my physician may have serious consequences to my condition to include possible death. I hereby assume responsibility for my refusal and release the hospital, its personnel, and my physicians from any responsibility for the consequences of my refusal.    o  Refuse   5.   I authorize the use of any specimen, organs, tissues, body parts or foreign objects that may be removed from my body during the operation/procedure for diagnosis, research or teaching purposes and their subsequent disposal by hospital authorities.  I also authorize the release of specimen test results and/or written reports to my treating physician on the hospital medical staff or other referring or consulting physicians involved in my care, at the discretion of the Pathologist or my treating physician.    6.   I consent to the photographing or videotaping of the operations or procedures to be performed, including appropriate portions of my body for medical, scientific, or educational purposes, provided my identity is not revealed by the pictures or by descriptive texts accompanying them.  If the procedure has been photographed/videotaped, the surgeon will obtain the original picture, image, videotape or CD.  The hospital will not be responsible for storage, release or maintenance of the picture, image, tape or CD.    7.   I consent to the presence of a  or observers in the operating room as deemed necessary by my physician or their designees.    8.   I recognize that in the event my procedure results in extended X-Ray/fluoroscopy time, I may  develop a skin reaction.    9. If I have a Do Not Attempt Resuscitation (DNAR) order in place, that status will be suspended while in the operating room, procedural suite, and during the recovery period unless otherwise explicitly stated by me (or a person authorized to consent on my behalf). The surgeon or my attending physician will determine when the applicable recovery period ends for purposes of reinstating the DNAR order.  10. Patients having a sterilization procedure: I understand that if the procedure is successful the results will be permanent and it will therefore be impossible for me to inseminate, conceive, or bear children.  I also understand that the procedure is intended to result in sterility, although the result has not been guaranteed.   11. I acknowledge that my physician has explained sedation/analgesia administration to me including the risk and benefits I consent to the administration of sedation/analgesia as may be necessary or desirable in the judgment of my physician.    I CERTIFY THAT I HAVE READ AND FULLY UNDERSTAND THE ABOVE CONSENT TO OPERATION and/or OTHER PROCEDURE.     _________________________________________ _________________________________     ___________________________________  Signature of Patient     Signature of Responsible Person                   Printed Name of Responsible Person                              _________________________________________ ______________________________        ___________________________________  Signature of Witness         Date  Time         Relationship to Patient    STATEMENT OF PHYSICIAN My signature below affirms that prior to the time of the procedure; I have explained to the patient and/or his/her legal representative, the risks and benefits involved in the proposed treatment and any reasonable alternative to the proposed treatment. I have also explained the risks and benefits involved in refusal of the proposed treatment and alternatives  to the proposed treatment and have answered the patient's questions. If I have a significant financial interest in a co-management agreement or a significant financial interest in any product or implant, or other significant relationship used in this procedure/surgery, I have disclosed this and had a discussion with my patient.     _______________________________________________________________ _____________________________  (Signature of Physician)                                                                                         (Date)                                   (Time)  Patient Name: Wanda Johnson    : 1980   Printed: 2024      Medical Record #: [id number]                                              Page 1 of 1

## (undated) NOTE — ED AVS SNAPSHOT
Ratna Mendez   MRN: [id number]    Department:  [facility] Emergency Department   Date of Visit:  2/1/2019           Disclosure     Insurance plans vary and the physician(s) referred by the ER may not be covered by your plan.  Please contact yo CARE PHYSICIAN AT ONCE OR RETURN IMMEDIATELY TO THE EMERGENCY DEPARTMENT. If you have been prescribed any medication(s), please fill your prescription right away and begin taking the medication(s) as directed.   If you believe that any of the medications